# Patient Record
Sex: FEMALE | Race: BLACK OR AFRICAN AMERICAN | Employment: FULL TIME | URBAN - METROPOLITAN AREA
[De-identification: names, ages, dates, MRNs, and addresses within clinical notes are randomized per-mention and may not be internally consistent; named-entity substitution may affect disease eponyms.]

---

## 2018-10-24 DIAGNOSIS — Z12.31 VISIT FOR SCREENING MAMMOGRAM: Primary | ICD-10-CM

## 2018-11-01 ENCOUNTER — HOSPITAL ENCOUNTER (OUTPATIENT)
Dept: MAMMOGRAPHY | Age: 38
Discharge: HOME | End: 2018-11-01
Attending: SURGERY

## 2018-11-01 DIAGNOSIS — N63.20 LEFT BREAST LUMP: Primary | ICD-10-CM

## 2018-11-01 DIAGNOSIS — Z12.31 VISIT FOR SCREENING MAMMOGRAM: ICD-10-CM

## 2018-11-01 NOTE — PROGRESS NOTES
6400 Marianela Guzmán called asking for a dx 3d mammogram order for patient who has a LEFT breast lump. Order placed in [facility].

## 2018-11-05 ENCOUNTER — HOSPITAL ENCOUNTER (OUTPATIENT)
Dept: MAMMOGRAPHY | Age: 38
Discharge: HOME | End: 2018-11-05
Attending: SURGERY
Payer: COMMERCIAL

## 2018-11-05 ENCOUNTER — HOSPITAL ENCOUNTER (OUTPATIENT)
Dept: ULTRASOUND IMAGING | Age: 38
Discharge: HOME | End: 2018-11-05
Attending: SURGERY
Payer: COMMERCIAL

## 2018-11-05 DIAGNOSIS — N63.20 LEFT BREAST LUMP: Primary | ICD-10-CM

## 2018-11-05 DIAGNOSIS — Z12.31 VISIT FOR SCREENING MAMMOGRAM: ICD-10-CM

## 2018-11-05 DIAGNOSIS — N63.20 LEFT BREAST LUMP: ICD-10-CM

## 2018-11-05 PROCEDURE — 76642 ULTRASOUND BREAST LIMITED: CPT

## 2018-11-05 PROCEDURE — 77062 BREAST TOMOSYNTHESIS BI: CPT

## 2018-11-05 NOTE — PROGRESS NOTES
Spoke w/Ms. Leyva concerning her recommendation for surgical follow up - Ms. 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 I gave Dr. Marck Amaro card w/contact number - she is aware of Dr. Marck Amaro locations because her last visit was in 2016.

## 2018-11-08 ENCOUNTER — DOCUMENTATION ONLY (OUTPATIENT)
Dept: SURGERY | Age: 38
End: 2018-11-08

## 2018-11-08 ENCOUNTER — OFFICE VISIT (OUTPATIENT)
Dept: SURGERY | Age: 38
End: 2018-11-08

## 2018-11-08 VITALS
HEIGHT: 69 IN | WEIGHT: 226 LBS | SYSTOLIC BLOOD PRESSURE: 119 MMHG | HEART RATE: 96 BPM | DIASTOLIC BLOOD PRESSURE: 72 MMHG | BODY MASS INDEX: 33.47 KG/M2

## 2018-11-08 DIAGNOSIS — N61.1 LEFT BREAST ABSCESS: Primary | ICD-10-CM

## 2018-11-08 NOTE — PROGRESS NOTES
HISTORY OF PRESENT ILLNESS  Jaxon Tijerina is a 34 y.o. female. HPI ESTABLISHED patient of Dr. Peter Pereyra here for LEFT breast lump, pain, and nipple discharge. Rates pain 6/10 at this time. Describes pain as sharp, \"off and on\" pain. Has not started Clindamycin yet. Breast history-  10/26/16- RIGHT breast I&D for abscess. - LEFT breast I&D for abscess. OB History      Para Term  AB Living    3 2 1     2    SAB TAB Ectopic Molar Multiple Live Births              1        Obstetric Comments    Menarche:  13. LMP: has IUD. # of Children:  2. Age at Delivery of First Child:  23.   Hysterectomy/oophorectomy:  NO/NO. Breast Bx:  yes. Hx of Breast Feeding:  yes. BCP:  yes. Hormone therapy:  no.             Past Surgical History:   Procedure Laterality Date    BREAST SURGERY PROCEDURE UNLISTED  13    Incision & Drainage Left Breast Mastitis     DELIVERY ONLY      HX  SECTION       x 2    HX DILATION AND CURETTAGE       Family history-  Paternal aunt diagnosed with breast cancer at age 44. Breast imaging-  18 3D mammogram and LEFT breast ultrasound:  Study Result   INDICATION:  Left breast lump and pain and nipple discharge. EXAMS: Bilateral 3-D Diagnostic Mammogram and Left Breast Sonogram  COMPARISON: None. BILATERAL 3-D MARIYA DIAGNOSTIC DIGITAL MAMMOGRAPHY with CAD applied to the 2-D  images, is performed. Non-circumscribed noncalcified central left breast mass is  present. There is left breast dermal thickening. There are no suspicious  calcifications. Contralateral breast is unremarkable. Breast Composition: Scattered fibroglandular densities. BREAST SONOGRAPHY of the central left breast shows a nonsimple fluid collection  extending approximately 11 cm from 4-8:00 location, with diameters of  approximately 2 to 3 cm. Appearance suggests abscess versus hematoma. IMPRESSION  IMPRESSION:  1. BIRADS: 2, Benign.    2. Left breast abscess versus hematoma. Report called to the referring office  and patient scheduled for office visit tomorrow 2018 at 1130 hours at [facility]. 3. No evidence of malignancy. 4. Annual bilateral screening mammogram followup recommended. ROS    Physical Exam   Constitutional: She appears well-developed and well-nourished. Pulmonary/Chest:       Psychiatric: She has a normal mood and affect. Her speech is normal and behavior is normal.     Aspiration  Indication : abscess at LEFT 5:00  Prep : Alcohol. Anesthesia: Lidocaine 1% with epi  Guidance : Guidance by US  Yield :  10ml of serous fluid was aspirated with an 18 gauge needle. Effect : Abscess completely aspirated. Tolerance: Pt tolerated the procedure with minimal discomfort  Disposition:  Culture sent. Follow-up 5 days. Incision and Drainage  Indication : Left breast abscess,at [facility]  Prep : alcohol. Anesthesia : Lidocaine 1% with epi  Yield : Significant purulent fluid drained  Irrigation: 10ml saline    Packin/4\" nugauze  Diposition: Okay to shower. Change dressing as needed. Pt instructed how to replace packing daily. Follow up 5 days. Visit Vitals  /72   Pulse 96   Ht 5' 9\" (1.753 m)   Wt 226 lb (102.5 kg)   BMI 33.37 kg/m²     ASSESSMENT and PLAN  Encounter Diagnoses   Name Primary?  Left breast abscess Yes     Left breast extensive abscess with multiple pockets. One area aspirated and one area with I&D as described above. Procedures performed by Dr. Sandra Wiggins. Wound care reviewed. Starts antibiotics - clindamycin previously ordered. Has follow-up with Dr. Sandra Wiggins next week. She is comfortable with this plan. All questions answered and she stated understanding.

## 2018-11-08 NOTE — PATIENT INSTRUCTIONS

## 2018-11-08 NOTE — PROGRESS NOTES
Spoke to Brayan Miranda, who will make follow up appointment for patient to come in on Monday 11/12/18 at [facility] G11 to follow up LEFT breast abscess, s/p I&D.

## 2018-11-12 ENCOUNTER — OFFICE VISIT (OUTPATIENT)
Dept: SURGERY | Age: 38
End: 2018-11-12

## 2018-11-12 VITALS
HEART RATE: 103 BPM | WEIGHT: 226 LBS | SYSTOLIC BLOOD PRESSURE: 119 MMHG | DIASTOLIC BLOOD PRESSURE: 78 MMHG | HEIGHT: 69 IN | BODY MASS INDEX: 33.47 KG/M2

## 2018-11-12 DIAGNOSIS — N61.1 LEFT BREAST ABSCESS: Primary | ICD-10-CM

## 2018-11-12 LAB — BACTERIA SPEC ANAEROBE CULT: NORMAL

## 2018-11-12 RX ORDER — SULFAMETHOXAZOLE AND TRIMETHOPRIM 800; 160 MG/1; MG/1
1 TABLET ORAL 2 TIMES DAILY
Qty: 20 TAB | Refills: 0 | Status: SHIPPED | OUTPATIENT
Start: 2018-11-12 | End: 2018-11-22

## 2018-11-12 NOTE — PROGRESS NOTES
HISTORY OF PRESENT ILLNESS  Robin Horn is a 34 y.o. female. HPI ESTABLISHED patient here today for follow up I&D LEFT breast abscess. The patient does not feel well. The abscess continues to drain, pain is a #6/10  and the patient is running a fever. It is currently 100.4 after having taken Motrin before the visit. She continues to take Clindamycin. Her culture came back proteus mirabilis with sensitivities attached. Review of Systems   All other systems reviewed and are negative. Physical Exam   Pulmonary/Chest:       Left breast with inferior pole erythema,   Purulent drainage from periareolar opening. Nursing note and vitals reviewed. ASSESSMENT and PLAN    ICD-10-CM ICD-9-CM    1. Left breast abscess N61.1 611.0      - will switch to bactrim  - schedule for I and D in OR. If starts having fevers or chills may need iv abx.

## 2018-11-12 NOTE — H&P (VIEW-ONLY)
HISTORY OF PRESENT ILLNESS Armando Solis is a 34 y.o. female. HPI ESTABLISHED patient here today for follow up I&D LEFT breast abscess. The patient does not feel well. The abscess continues to drain, pain is a #6/10  and the patient is running a fever. It is currently 100.4 after having taken Motrin before the visit. She continues to take Clindamycin. Her culture came back proteus mirabilis with sensitivities attached. Review of Systems All other systems reviewed and are negative. Physical Exam  
Pulmonary/Chest:  
 
 
Left breast with inferior pole erythema,  
Purulent drainage from periareolar opening. Nursing note and vitals reviewed. ASSESSMENT and PLAN 
  ICD-10-CM ICD-9-CM 1. Left breast abscess N61.1 611.0   
 
- will switch to bactrim 
- schedule for I and D in OR. If starts having fevers or chills may need iv abx.

## 2018-11-12 NOTE — LETTER
NOTIFICATION OF RETURN TO WORK / SCHOOL 
11/12/2018 Ms. [address] [place] [phone] To Whom It May Concern: 
 
Jimena Ford is under the care of [facility] She will return to work on 11/14/2018  with no restrictions. If there are questions or concerns please have the patient contact our office. Sincerely, Hay Ramirez MD

## 2018-11-12 NOTE — PATIENT INSTRUCTIONS
Breast Abscess: Care Instructions  Your Care Instructions  An abscess is a pocket of pus formed by infection. Breast abscesses are most common in women who are breastfeeding. You can usually continue to breastfeed your baby in spite of a breast abscess. It will not harm your baby. If your doctor advises you to stop breastfeeding on the affected breast while it heals, you can continue breastfeeding from the healthy breast.  Sometimes antibiotics are used to treat a breast abscess. If antibiotics do not cure the abscess, it may need to be drained through a small cut (incision). You may have had a sedative to help you relax. You may be unsteady after having sedation. It can take a few hours for the medicine's effects to wear off. Common side effects of sedation include nausea, vomiting, and feeling sleepy or tired. The doctor has checked you carefully, but problems can develop later. If you notice any problems or new symptoms, get medical treatment right away. Follow-up care is a key part of your treatment and safety. Be sure to make and go to all appointments, and call your doctor if you are having problems. It's also a good idea to know your test results and keep a list of the medicines you take. How can you care for yourself at home? · If the doctor gave you a sedative:  ? For 24 hours, don't do anything that requires attention to detail. It takes time for the medicine's effects to completely wear off.  ? For your safety, do not drive or operate any machinery that could be dangerous. Wait until the medicine wears off and you can think clearly and react easily. · If your doctor prescribed antibiotics, take them as directed. Do not stop taking them just because you feel better. You need to take the full course of antibiotics. · If your doctor drained the abscess, you may have a tube or gauze in the abscess to allow it to continue draining.  Follow your doctor's instructions on bathing and caring for the wound.  · If you are breastfeeding, continue breastfeeding or pumping breast milk, as your doctor advises. It is important to empty your breasts regularly. However, your doctor may advise you to discard the milk from the affected breast until the abscess heals. ? Before breastfeeding, place a warm, wet washcloth over the breast for about 15 minutes. Try this at least 3 times a day. ? If pus is no longer draining from the abscess, breastfeed on both sides. ? Gently massage your breast to stimulate milk flow. ? Pump or hand-express a small amount of breast milk before breastfeeding if your breasts are too full with milk or if it hurts too much to nurse. This will make your breasts less full and may make it easier for your baby to nurse. ? Try feeding from the healthy breast. Then, after your milk is flowing, breastfeed from the affected breast until it feels soft. · Be safe with medicines. Take pain medicines exactly as directed. ? If your doctor gave you a prescription medicine for pain, take it as prescribed. ? If you are not taking a prescription pain medicine, ask your doctor if you can take an over-the-counter medicine. ? Do not take two or more pain medicines at the same time unless your doctor told you to. Many pain medicines have acetaminophen, which is Tylenol. Too much acetaminophen (Tylenol) can be harmful. · Put ice or a cold pack on your breast for 10 to 15 minutes at a time to reduce pain and swelling. If you are breastfeeding, do this between feedings. Put a thin cloth between the ice and your skin. · If pus is draining from your infected breast, wash the nipple gently and let it air-dry before you put your bra back on. A disposable breast pad placed in the bra cup will help absorb the pus. When should you call for help? Call 911 anytime you think you may need emergency care.  For example, call if:    · You have trouble breathing.     · You passed out (lost consciousness).    Call your doctor now or seek immediate medical care if:    · You have new or worse nausea or vomiting.     · Your symptoms of infection get worse. This may include:  ? Increased pain, swelling, redness, or warmth around a breast.  ? Red streaks extending from the breast.  ? Pus draining from a breast.  ? A new or higher fever.    Watch closely for changes in your health, and be sure to contact your doctor if:    · You do not get better as expected. Where can you learn more? Go to http://levon-dawson.info/. Enter F704 in the search box to learn more about \"Breast Abscess: Care Instructions. \"  Current as of: May 15, 2018  Content Version: 11.8  © 3970-2089 [employer]wise, [employer]. Care instructions adapted under license by [employer] (which disclaims liability or warranty for this information). If you have questions about a medical condition or this instruction, always ask your healthcare professional. [address] any warranty or liability for your use of this information.

## 2018-11-13 ENCOUNTER — ANESTHESIA EVENT (OUTPATIENT)
Dept: SURGERY | Age: 38
End: 2018-11-13
Payer: COMMERCIAL

## 2018-11-13 NOTE — PERIOP NOTES
[facility] Ambulatory Surgery Unit Pre-operative Instructions Surgery/Procedure Date  Wednesday, November 14, 2018            Tentative Arrival Time 9287 1. On the day of your surgery/procedure, please report to the Ambulatory Surgery Unit Registration Desk and sign in at your designated time. The Ambulatory Surgery Unit is located in [facility] on the [facility] side of the [facility] across from the [address] [place] [address]. Please have all of your health insurance cards and a photo ID. 2. You must have someone with you to drive you home, as you should not drive a car for 24 hours following anesthesia. Please make arrangements for a responsible adult friend or family member to stay with you for at least the first 24 hours after your surgery. 3. Do not have anything to eat or drink (including water, gum, mints, coffee, juice) after 11:59 PM, tonight. This may not apply to medications prescribed by your physician. (Please note below the special instructions with medications to take the morning of surgery, if applicable.) 4. We recommend you do not drink any alcoholic beverages for 24 hours before and after your surgery. 5. Contact your surgeons office for instructions on the following medications: non-steroidal anti-inflammatory drugs (i.e. Advil, Aleve), vitamins, and supplements. (Some surgeons will want you to stop these medications prior to surgery and others may allow you to take them) **If you are currently taking Plavix, Coumadin, Aspirin and/or other blood-thinning agents, contact your surgeon for instructions. ** Your surgeon will partner with the physician prescribing these medications to determine if it is safe to stop or if you need to continue taking. Please do not stop taking these medications without instructions from your surgeon.  
 
6. In an effort to help prevent surgical site infection, we ask that you shower with an anti-bacterial soap (i.e. Dial/Safeguard, or the soap provided to you at your preadmission testing appointment) for 3 days prior to and on the morning of surgery, using a fresh towel after each shower. (Please begin this process with fresh bed linens.) Do not apply any lotions, powders, or deodorants after the shower on the day of your procedure. If applicable, please do not shave the operative site for 48 hours prior to surgery. 7. Wear comfortable clothes. Wear glasses instead of contacts. Do not bring any jewelry or money (other than copays or fees as instructed). Do not wear make-up, particularly mascara, the morning of your surgery. Do not wear nail polish, particularly if you are having foot /hand surgery. Wear your hair loose or down, no ponytails, buns, ashley pins or clips. All body piercings must be removed. 8. You should understand that if you do not follow these instructions your surgery may be cancelled. If your physical condition changes (i.e. fever, cold or flu) please contact your surgeon as soon as possible. 9. It is important that you be on time. If a situation occurs where you may be late, or if you have any questions or problems, please call [phone]. 
 
10. Your surgery time may be subject to change. You will receive a phone call the day prior to surgery to confirm your arrival time. 11. Pediatric patients: please bring a change of clothes, diapers, bottle/sippy cup, pacifier, etc. 
 
 
Special Instructions: Take all medications and inhalers, as prescribed, on the morning of surgery with a sip of water EXCEPT: no over the counter medications day of surgery. I understand a pre-operative phone call will be made to verify my surgery time. In the event that I am not available, I give permission for a message to be left on my answering service and/or with another person? yes Preop instructions reviewed  Pt verbalized understanding. ___________________      ___________________      ________________ 
(Signature of Patient)          (Witness)                   (Date and Time)

## 2018-11-14 ENCOUNTER — HOSPITAL ENCOUNTER (OUTPATIENT)
Age: 38
Setting detail: OUTPATIENT SURGERY
Discharge: HOME | End: 2018-11-14
Attending: SURGERY | Admitting: SURGERY
Payer: COMMERCIAL

## 2018-11-14 ENCOUNTER — ANESTHESIA (OUTPATIENT)
Dept: SURGERY | Age: 38
End: 2018-11-14
Payer: COMMERCIAL

## 2018-11-14 VITALS
HEIGHT: 69 IN | DIASTOLIC BLOOD PRESSURE: 69 MMHG | BODY MASS INDEX: 33.47 KG/M2 | TEMPERATURE: 98.2 F | RESPIRATION RATE: 19 BRPM | OXYGEN SATURATION: 99 % | WEIGHT: 226 LBS | HEART RATE: 71 BPM | SYSTOLIC BLOOD PRESSURE: 107 MMHG

## 2018-11-14 DIAGNOSIS — N61.1 ABSCESS OF BREAST: Primary | ICD-10-CM

## 2018-11-14 LAB — HCG UR QL: NEGATIVE

## 2018-11-14 PROCEDURE — 87205 SMEAR GRAM STAIN: CPT

## 2018-11-14 PROCEDURE — 74011250636 HC RX REV CODE- 250/636

## 2018-11-14 PROCEDURE — 76210000034 HC AMBSU PH I REC 0.5 TO 1 HR: Performed by: SURGERY

## 2018-11-14 PROCEDURE — 87077 CULTURE AEROBIC IDENTIFY: CPT

## 2018-11-14 PROCEDURE — 76060000062 HC AMB SURG ANES 1 TO 1.5 HR: Performed by: SURGERY

## 2018-11-14 PROCEDURE — 77030020143 HC AIRWY LARYN INTUB CGAS -A: Performed by: REGISTERED NURSE

## 2018-11-14 PROCEDURE — 77030011267 HC ELECTRD BLD COVD -A: Performed by: SURGERY

## 2018-11-14 PROCEDURE — 77030011825 HC SUPP SURG PSTOP S2SG -B: Performed by: SURGERY

## 2018-11-14 PROCEDURE — 88305 TISSUE EXAM BY PATHOLOGIST: CPT

## 2018-11-14 PROCEDURE — 87075 CULTR BACTERIA EXCEPT BLOOD: CPT

## 2018-11-14 PROCEDURE — 76210000057 HC AMBSU PH II REC 1 TO 1.5 HR: Performed by: SURGERY

## 2018-11-14 PROCEDURE — 74011250637 HC RX REV CODE- 250/637: Performed by: ANESTHESIOLOGY

## 2018-11-14 PROCEDURE — 77030020255 HC SOL INJ LR 1000ML BG: Performed by: SURGERY

## 2018-11-14 PROCEDURE — 87186 SC STD MICRODIL/AGAR DIL: CPT

## 2018-11-14 PROCEDURE — 77030021352 HC CBL LD SYS DISP COVD -B: Performed by: SURGERY

## 2018-11-14 PROCEDURE — 74011000272 HC RX REV CODE- 272: Performed by: SURGERY

## 2018-11-14 PROCEDURE — 77030011640 HC PAD GRND REM COVD -A: Performed by: SURGERY

## 2018-11-14 PROCEDURE — 74011000250 HC RX REV CODE- 250: Performed by: SURGERY

## 2018-11-14 PROCEDURE — 74011250636 HC RX REV CODE- 250/636: Performed by: ANESTHESIOLOGY

## 2018-11-14 PROCEDURE — 74011250636 HC RX REV CODE- 250/636: Performed by: SURGERY

## 2018-11-14 PROCEDURE — 76030000001 HC AMB SURG OR TIME 1 TO 1.5: Performed by: SURGERY

## 2018-11-14 PROCEDURE — 81025 URINE PREGNANCY TEST: CPT

## 2018-11-14 RX ORDER — LIDOCAINE HYDROCHLORIDE 10 MG/ML
0.1 INJECTION, SOLUTION EPIDURAL; INFILTRATION; INTRACAUDAL; PERINEURAL AS NEEDED
Status: DISCONTINUED | OUTPATIENT
Start: 2018-11-14 | End: 2018-11-14 | Stop reason: HOSPADM

## 2018-11-14 RX ORDER — SODIUM CHLORIDE, SODIUM LACTATE, POTASSIUM CHLORIDE, CALCIUM CHLORIDE 600; 310; 30; 20 MG/100ML; MG/100ML; MG/100ML; MG/100ML
25 INJECTION, SOLUTION INTRAVENOUS CONTINUOUS
Status: DISCONTINUED | OUTPATIENT
Start: 2018-11-14 | End: 2018-11-14 | Stop reason: HOSPADM

## 2018-11-14 RX ORDER — SODIUM CHLORIDE 0.9 % (FLUSH) 0.9 %
5-10 SYRINGE (ML) INJECTION AS NEEDED
Status: DISCONTINUED | OUTPATIENT
Start: 2018-11-14 | End: 2018-11-14 | Stop reason: HOSPADM

## 2018-11-14 RX ORDER — VANCOMYCIN HYDROCHLORIDE 1 G/20ML
INJECTION, POWDER, LYOPHILIZED, FOR SOLUTION INTRAVENOUS
Status: DISCONTINUED
Start: 2018-11-14 | End: 2018-11-14 | Stop reason: HOSPADM

## 2018-11-14 RX ORDER — OXYCODONE HYDROCHLORIDE 5 MG/1
TABLET ORAL
Status: DISCONTINUED
Start: 2018-11-14 | End: 2018-11-14 | Stop reason: HOSPADM

## 2018-11-14 RX ORDER — FENTANYL CITRATE 50 UG/ML
INJECTION, SOLUTION INTRAMUSCULAR; INTRAVENOUS AS NEEDED
Status: DISCONTINUED | OUTPATIENT
Start: 2018-11-14 | End: 2018-11-14 | Stop reason: HOSPADM

## 2018-11-14 RX ORDER — HYDROMORPHONE HYDROCHLORIDE 1 MG/ML
.2-.5 INJECTION, SOLUTION INTRAMUSCULAR; INTRAVENOUS; SUBCUTANEOUS ONCE
Status: COMPLETED | OUTPATIENT
Start: 2018-11-14 | End: 2018-11-14

## 2018-11-14 RX ORDER — OXYCODONE AND ACETAMINOPHEN 5; 325 MG/1; MG/1
1 TABLET ORAL
Status: DISCONTINUED | OUTPATIENT
Start: 2018-11-14 | End: 2018-11-14 | Stop reason: HOSPADM

## 2018-11-14 RX ORDER — OXYCODONE AND ACETAMINOPHEN 5; 325 MG/1; MG/1
1 TABLET ORAL
Qty: 40 TAB | Refills: 0 | Status: SHIPPED | OUTPATIENT
Start: 2018-11-14 | End: 2018-12-06 | Stop reason: ALTCHOICE

## 2018-11-14 RX ORDER — DIPHENHYDRAMINE HYDROCHLORIDE 50 MG/ML
12.5 INJECTION, SOLUTION INTRAMUSCULAR; INTRAVENOUS AS NEEDED
Status: DISCONTINUED | OUTPATIENT
Start: 2018-11-14 | End: 2018-11-14 | Stop reason: HOSPADM

## 2018-11-14 RX ORDER — MORPHINE SULFATE 10 MG/ML
2 INJECTION, SOLUTION INTRAMUSCULAR; INTRAVENOUS
Status: DISCONTINUED | OUTPATIENT
Start: 2018-11-14 | End: 2018-11-14 | Stop reason: HOSPADM

## 2018-11-14 RX ORDER — SODIUM CHLORIDE 900 MG/100ML
250 INJECTION INTRAVENOUS ONCE
Status: DISCONTINUED | OUTPATIENT
Start: 2018-11-14 | End: 2018-11-14 | Stop reason: HOSPADM

## 2018-11-14 RX ORDER — SODIUM CHLORIDE 900 MG/100ML
INJECTION INTRAVENOUS
Status: COMPLETED
Start: 2018-11-14 | End: 2018-11-14

## 2018-11-14 RX ORDER — FENTANYL CITRATE 50 UG/ML
25 INJECTION, SOLUTION INTRAMUSCULAR; INTRAVENOUS
Status: DISCONTINUED | OUTPATIENT
Start: 2018-11-14 | End: 2018-11-14 | Stop reason: HOSPADM

## 2018-11-14 RX ORDER — ONDANSETRON 2 MG/ML
INJECTION INTRAMUSCULAR; INTRAVENOUS AS NEEDED
Status: DISCONTINUED | OUTPATIENT
Start: 2018-11-14 | End: 2018-11-14 | Stop reason: HOSPADM

## 2018-11-14 RX ORDER — LIDOCAINE HYDROCHLORIDE 20 MG/ML
INJECTION, SOLUTION EPIDURAL; INFILTRATION; INTRACAUDAL; PERINEURAL AS NEEDED
Status: DISCONTINUED | OUTPATIENT
Start: 2018-11-14 | End: 2018-11-14 | Stop reason: HOSPADM

## 2018-11-14 RX ORDER — ACETAMINOPHEN 10 MG/ML
INJECTION, SOLUTION INTRAVENOUS AS NEEDED
Status: DISCONTINUED | OUTPATIENT
Start: 2018-11-14 | End: 2018-11-14 | Stop reason: HOSPADM

## 2018-11-14 RX ORDER — DEXAMETHASONE SODIUM PHOSPHATE 4 MG/ML
INJECTION, SOLUTION INTRA-ARTICULAR; INTRALESIONAL; INTRAMUSCULAR; INTRAVENOUS; SOFT TISSUE AS NEEDED
Status: DISCONTINUED | OUTPATIENT
Start: 2018-11-14 | End: 2018-11-14 | Stop reason: HOSPADM

## 2018-11-14 RX ORDER — MIDAZOLAM HYDROCHLORIDE 1 MG/ML
INJECTION, SOLUTION INTRAMUSCULAR; INTRAVENOUS AS NEEDED
Status: DISCONTINUED | OUTPATIENT
Start: 2018-11-14 | End: 2018-11-14 | Stop reason: HOSPADM

## 2018-11-14 RX ORDER — SODIUM CHLORIDE 0.9 % (FLUSH) 0.9 %
5-10 SYRINGE (ML) INJECTION EVERY 8 HOURS
Status: DISCONTINUED | OUTPATIENT
Start: 2018-11-14 | End: 2018-11-14 | Stop reason: HOSPADM

## 2018-11-14 RX ORDER — OXYCODONE HYDROCHLORIDE 5 MG/1
5 TABLET ORAL ONCE
Status: COMPLETED | OUTPATIENT
Start: 2018-11-14 | End: 2018-11-14

## 2018-11-14 RX ORDER — PROPOFOL 10 MG/ML
INJECTION, EMULSION INTRAVENOUS AS NEEDED
Status: DISCONTINUED | OUTPATIENT
Start: 2018-11-14 | End: 2018-11-14 | Stop reason: HOSPADM

## 2018-11-14 RX ADMIN — ONDANSETRON 4 MG: 2 INJECTION INTRAMUSCULAR; INTRAVENOUS at 08:12

## 2018-11-14 RX ADMIN — MIDAZOLAM HYDROCHLORIDE 2 MG: 1 INJECTION, SOLUTION INTRAMUSCULAR; INTRAVENOUS at 07:28

## 2018-11-14 RX ADMIN — PROPOFOL 200 MG: 10 INJECTION, EMULSION INTRAVENOUS at 07:34

## 2018-11-14 RX ADMIN — FENTANYL CITRATE 50 MCG: 50 INJECTION, SOLUTION INTRAMUSCULAR; INTRAVENOUS at 08:17

## 2018-11-14 RX ADMIN — SODIUM CHLORIDE, SODIUM LACTATE, POTASSIUM CHLORIDE, AND CALCIUM CHLORIDE 25 ML/HR: 600; 310; 30; 20 INJECTION, SOLUTION INTRAVENOUS at 07:09

## 2018-11-14 RX ADMIN — SODIUM CHLORIDE 250 ML: 900 INJECTION, SOLUTION INTRAVENOUS at 07:26

## 2018-11-14 RX ADMIN — FENTANYL CITRATE 25 MCG: 50 INJECTION, SOLUTION INTRAMUSCULAR; INTRAVENOUS at 09:40

## 2018-11-14 RX ADMIN — DEXAMETHASONE SODIUM PHOSPHATE 8 MG: 4 INJECTION, SOLUTION INTRA-ARTICULAR; INTRALESIONAL; INTRAMUSCULAR; INTRAVENOUS; SOFT TISSUE at 07:47

## 2018-11-14 RX ADMIN — SODIUM CHLORIDE, SODIUM LACTATE, POTASSIUM CHLORIDE, AND CALCIUM CHLORIDE 25 ML/HR: 600; 310; 30; 20 INJECTION, SOLUTION INTRAVENOUS at 10:36

## 2018-11-14 RX ADMIN — FENTANYL CITRATE 25 MCG: 50 INJECTION, SOLUTION INTRAMUSCULAR; INTRAVENOUS at 10:18

## 2018-11-14 RX ADMIN — FENTANYL CITRATE 25 MCG: 50 INJECTION, SOLUTION INTRAMUSCULAR; INTRAVENOUS at 09:27

## 2018-11-14 RX ADMIN — VANCOMYCIN HYDROCHLORIDE 1000 MG: 1 INJECTION, POWDER, LYOPHILIZED, FOR SOLUTION INTRAVENOUS at 07:24

## 2018-11-14 RX ADMIN — FENTANYL CITRATE 50 MCG: 50 INJECTION, SOLUTION INTRAMUSCULAR; INTRAVENOUS at 08:05

## 2018-11-14 RX ADMIN — LIDOCAINE HYDROCHLORIDE 40 MG: 20 INJECTION, SOLUTION EPIDURAL; INFILTRATION; INTRACAUDAL; PERINEURAL at 07:34

## 2018-11-14 RX ADMIN — OXYCODONE HYDROCHLORIDE 5 MG: 5 TABLET ORAL at 10:02

## 2018-11-14 RX ADMIN — HYDROMORPHONE HYDROCHLORIDE 0.2 MG: 1 INJECTION, SOLUTION INTRAMUSCULAR; INTRAVENOUS; SUBCUTANEOUS at 09:49

## 2018-11-14 RX ADMIN — ACETAMINOPHEN 1000 MG: 10 INJECTION, SOLUTION INTRAVENOUS at 08:06

## 2018-11-14 RX ADMIN — FENTANYL CITRATE 25 MCG: 50 INJECTION, SOLUTION INTRAMUSCULAR; INTRAVENOUS at 08:26

## 2018-11-14 RX ADMIN — FENTANYL CITRATE 25 MCG: 50 INJECTION, SOLUTION INTRAMUSCULAR; INTRAVENOUS at 08:24

## 2018-11-14 RX ADMIN — FENTANYL CITRATE 50 MCG: 50 INJECTION, SOLUTION INTRAMUSCULAR; INTRAVENOUS at 07:34

## 2018-11-14 RX ADMIN — HYDROMORPHONE HYDROCHLORIDE 0.2 MG: 1 INJECTION, SOLUTION INTRAMUSCULAR; INTRAVENOUS; SUBCUTANEOUS at 09:21

## 2018-11-14 NOTE — OP NOTES
[address] REPORT    Neftali Kelly  MR#: [id number]  : 1980  ACCOUNT #: [de-identified]   DATE OF SERVICE: 2018    PREOPERATIVE DIAGNOSIS:  Left breast complicated abscess. POSTOPERATIVE DIAGNOSIS:  Left breast complicated abscess. PROCEDURE PERFORMED:  Incision and drainage, left breast tissue biopsy. SURGEON:  Micki Escalante MD       ASSISTANT:  None. ANESTHESIA:  General.      ESTIMATED BLOOD LOSS:  20 mL. SPECIMENS REMOVED:    1. Left breast abscess for culture. 2.  Left breast abscess tissue. FINDINGS:  Loculated complicated abscess, 694 mL of pus drained. COMPLICATIONS:  None. IMPLANTS:  No implants. INDICATIONS FOR PROCEDURE:  A 22-year-old female with a history of bilateral breast abscesses. She had a recurrent left breast abscess that I attempted to drain in the office under local anesthetic, and this did not get completely drained and therefore, she needed drainage in the operating room. PROCEDURE IN DETAIL:  The patient was seen in the preop holding area where surgical site was marked by surgeon. Informed consent was obtained. She was taken to the operating room and laid in supine position where LMA anesthesia was induced. Left breast prepped and draped in the usual fashion. A timeout was performed. Attention was turned to the left breast.  Using ultrasound, a large, loculated fluid collection was seen at 6 o'clock in the left breast and a previous periareolar incision at 9 o'clock had been made. A 10 blade was used to make a 1 cm incision. A tonsil was used to break up the loculations. These tunneled all the way over to 8 o'clock in the breast as well as 5 o'clock as well as had a connecting tunnel back behind the nipple. This was fully drained, and 150 mL of pus was drained. This was irrigated with 2 liters of normal saline with Bacitracin solution.   A tissue sample was taken at 6 o'clock and sent for permanent pathology. Cultures were obtained. Each area was packed with half inch gauze strip packing. She will follow up in 2 days to have this removed. A sterile dressing was applied as well as a bra. All sponge, needle, and instrument counts were correct. The patient was stable through the procedure.       MD TI Woodard /   D: 11/14/2018 08:37     T: 11/14/2018 09:04  JOB #: [id number]

## 2018-11-14 NOTE — PERIOP NOTES
[address] [facility] 1980 
[id number] Situation: 
Verbal report given from: Stuart Benton and 
 LEMUEL Hough RN Procedure: Procedure(s): 
INCISION AND DRAINAGE LEFT BREAST Background: 
 
Preoperative diagnosis: LEFT BREAST ABSCESS Postoperative diagnosis: * No post-op diagnosis entered * :  Dr. Josie Banerjee Assistant(s): Circ-1: Jeanie Barraza RN Scrub Tech-1: Sadie Major Specimens:  
ID Type Source Tests Collected by Time Destination 1 : Left breast abscess Preservative Breast  Mayte Valdez MD 11/14/2018 6468 Pathology 1 : Left breast abscess Abscess Breast ANAEROBIC/AEROBIC/GRAM STAIN Mayte Valdez MD 11/14/2018 8217 Microbiology Assessment: 
Intra-procedure medications Anesthesia gave intra-procedure sedation and medications, see anesthesia flow sheet Intravenous fluids: Marjan Henry Vital signs stable Recommendation: 
 
Permission to share finding with [facility].

## 2018-11-14 NOTE — ANESTHESIA POSTPROCEDURE EVALUATION
Procedure(s): 
INCISION AND DRAINAGE LEFT BREAST. Anesthesia Post Evaluation Multimodal analgesia: multimodal analgesia used between 6 hours prior to anesthesia start to PACU discharge Patient location during evaluation: bedside Patient participation: complete - patient participated Level of consciousness: awake and alert Pain score: 0 Airway patency: patent Anesthetic complications: no 
Cardiovascular status: hemodynamically stable Respiratory status: room air and spontaneous ventilation Hydration status: euvolemic Post anesthesia nausea and vomiting:  none Visit Vitals /64 Pulse 71 Temp 36.8 °C (98.2 °F) Resp 19 Ht 5' 9\" (1.753 m) Wt 102.5 kg (226 lb) SpO2 99% BMI 33.37 kg/m²

## 2018-11-14 NOTE — INTERVAL H&P NOTE
H&P Update: 
Benny Leyva was seen and examined. History and physical has been reviewed. The patient has been examined.  There have been no significant clinical changes since the completion of the originally dated History and Physical. 
 
Signed By: Danielle Ann MD   
 November 14, 2018 6:32 AM

## 2018-11-14 NOTE — ANESTHESIA PREPROCEDURE EVALUATION
Anesthetic History No history of anesthetic complications Review of Systems / Medical History Patient summary reviewed, nursing notes reviewed and pertinent labs reviewed Pulmonary Within defined limits Neuro/Psych Within defined limits Cardiovascular Within defined limits Exercise tolerance: >4 METS 
  
GI/Hepatic/Renal 
Within defined limits Endo/Other Within defined limits Other Findings Comments: PCOS Breast abcess Physical Exam 
 
Airway Mallampati: I 
TM Distance: 4 - 6 cm Neck ROM: normal range of motion Mouth opening: Normal 
 
 Cardiovascular Regular rate and rhythm,  S1 and S2 normal,  no murmur, click, rub, or gallop Rhythm: regular Rate: normal 
 
 
 
 Dental 
No notable dental hx Pulmonary Breath sounds clear to auscultation Abdominal 
GI exam deferred Other Findings Anesthetic Plan ASA: 2 Anesthesia type: general 
 
 
 
 
Induction: Intravenous Anesthetic plan and risks discussed with: Patient LMA OK

## 2018-11-14 NOTE — BRIEF OP NOTE
BRIEF OPERATIVE NOTE Date of Procedure: 11/14/2018 Preoperative Diagnosis: LEFT BREAST COMPLICATED ABSCESS Postoperative Diagnosis: SAME Procedure(s): 
INCISION AND DRAINAGE LEFT BREAST, TISSUE BIOPSY Surgeon(s) and Role: Destiny Weller MD - Primary Surgical Assistant: NONE Surgical Staff: 
Circ-1: Leora Moncada RN Scrub Tech-1: Brennan Gaspar Event Time In Time Out Incision Start 2885 Incision Close 1567 Anesthesia: General  
Estimated Blood Loss: 20 ML Specimens:  
ID Type Source Tests Collected by Time Destination 1 : Left breast abscess Preservative Breast  Les Mcfadden MD 11/14/2018 2266 Pathology 1 : Left breast abscess Abscess Breast ANAEROBIC/AEROBIC/GRAM STAIN Les Mcfadden MD 11/14/2018 9664 Microbiology Findings: LOCULATED COMPLICATED ABSCESS 
172 CC PUS DRAINED Complications: NONE Implants: * No implants in log * DICTATED [id number]

## 2018-11-14 NOTE — DISCHARGE INSTRUCTIONS
Discharge Instructions from Dr. Ebony Claude    · I will call you with the pathology results, typically within 1 week from today. · You may shower, but no hot tubs, swimming pools, or baths until your incision is healed. · No heavy lifting with the affected extremity (nothing greater than 5 pounds), and limit its use for the next 4-5 days. · You may use an ice pack for comfort for the next couple of days, but do not place ice directly on the skin. Rather, use a towel or clothing to serve as a barrier between skin and ice to prevent injury. · If I placed a drain, follow the drain instructions provided, especially as you keep a record of the drain output. · Follow medication instructions carefully. · Wear surgical bra for 24 hours, then remove. Wear supportive bra at all times. Dr. Ebony Claude will remove your packing this Friday. · You will have bruising and swelling  · Watch for signs of infection as listed below. · Redness  · Swelling  · Drainage from the incision or from your nipple that appears infected  · Fever over 101.5 degrees for consecutive readings, or over 99.5 if you are currently undergoing chemotherapy. · Call our office (number is below) for a follow-up appointment. · If you have any problems, our phone number is [phone]  You received an IV form of Tylenol 1000mg during your surgery, you may take tylenol (or pain medication containing Tylenol or Acetaminophen) in 6 hours at 2pm    DO NOT TAKE TYLENOL/ACETAMINOPHEN WITH PERCOCET. TAKE NARCOTIC PAIN MEDICATIONS WITH FOOD     If given 2 pain narcotics do NOT take together! Narcotics tend to be constipating, we suggest taking a stool softener such as Colace or Miralax (follow package instructions). DO NOT DRIVE WHILE TAKING NARCOTIC PAIN MEDICATIONS. DO NOT TAKE SLEEPING MEDICATIONS OR ANTIANXIETY MEDICATIONS WHILE TAKING NARCOTIC PAIN MEDICATIONS,  ESPECIALLY THE NIGHT OF ANESTHESIA!     CPAP PATIENTS BE SURE TO WEAR MACHINE WHENEVER NAPPING OR SLEEPING! DISCHARGE SUMMARY from Nurse    The following personal items collected during your admission are returned to you:   Dental Appliance: Dental Appliances: None  Vision: Visual Aid: Glasses, At home  Hearing Aid:    Jewelry: Jewelry: Other (comment)(waist beads removed)  Clothing: Clothing: Other (comment)(belonging bag)  Other Valuables: Other Valuables: Cell Phone(placed inside shoe per pt)  Valuables sent to safe:        PATIENT INSTRUCTIONS:    After General Anesthesia or Intravenous Sedation, for 24 hours or while taking prescription Narcotics:        Someone should be with you for the next 24 hours. For your own safety, a responsible adult must drive you home. · Limit your activities  · Recommended activity: Rest today, up with assistance today. Do not climb stairs or shower unattended for the next 24 hours. · Please start with a soft bland diet and advance as tolerated (no nausea) to regular diet. · If you have a sore throat you should try the following: fluids, warm salt water gargles, or throat lozenges. If it does not improve after several days please follow up with your primary physician. · Do not drive and operate hazardous machinery  · Do not make important personal or business decisions  · Do  not drink alcoholic beverages  · If you have not urinated within 8 hours after discharge, please contact your surgeon on call. Report the following to your surgeon:  · Excessive pain, swelling, redness or odor of or around the surgical area  · Temperature over 100.5  · Nausea and vomiting lasting longer than 4 hours or if unable to take medications  · Any signs of decreased circulation or nerve impairment to extremity: change in color, persistent  numbness, tingling, coldness or increase pain      · You will receive a Post Operative Call from one of the Recovery Room Nurses on the day after your surgery to check on you.  It is very important for us to know how you are recovering after your surgery. If you have an issue or need to speak with someone, please call your surgeon, do not wait for the post operative call. · You may receive an e-mail or letter in the mail from [facility] regarding your experience with us in the Ambulatory Surgery Unit. Your feedback is valuable to us and we appreciate your participation in the survey. · If the above instructions are not adequate or you are having problems after your surgery, call the physician at their office number. · We wish you a speedy recovery ? What to do at Home:      *  Please give a list of your current medications to your Primary Care Provider. *  Please update this list whenever your medications are discontinued, doses are      changed, or new medications (including over-the-counter products) are added. *  Please carry medication information at all times in case of emergency situations. These are general instructions for a healthy lifestyle:    No smoking/ No tobacco products/ Avoid exposure to second hand smoke    Surgeon General's Warning:  Quitting smoking now greatly reduces serious risk to your health. Obesity, smoking, and sedentary lifestyle greatly increases your risk for illness    A healthy diet, regular physical exercise & weight monitoring are important for maintaining a healthy lifestyle    You may be retaining fluid if you have a history of heart failure or if you experience any of the following symptoms:  Weight gain of 3 pounds or more overnight or 5 pounds in a week, increased swelling in our hands or feet or shortness of breath while lying flat in bed. Please call your doctor as soon as you notice any of these symptoms; do not wait until your next office visit.     Recognize signs and symptoms of STROKE:    B - Balance  E - Eyes    F-  Face looks uneven  A-  Arms unable to move or move even  S-  Speech slurred or non-existent  T-  Time-call 911 as soon as signs and symptoms begin-DO NOT go       Back to bed or wait to see if you get better-TIME IS BRAIN. If you have not received your influenza and/or pneumococcal vaccine, please follow up with your primary care physician. The discharge information has been reviewed with the patient and caregiver. The patient and caregiver verbalized understanding.

## 2018-11-15 ENCOUNTER — PATIENT OUTREACH (OUTPATIENT)
Dept: OTHER | Age: 38
End: 2018-11-15

## 2018-11-15 NOTE — PROGRESS NOTES
CM outreach. Verified  and address for HIPAA security. Introduced [employer] for patient. Patient does not identify any Care Management needs at this time and declines services. 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 MD hernández tomorrow for first dressing change. Chart Review/   OP I&D of L breast abscess  SPECIMENS REMOVED:   
1. Left breast abscess for culture. 2.  Left breast abscess tissue.   
  
FINDINGS:  Loculated complicated abscess, 788 mL of pus drained.

## 2018-11-16 ENCOUNTER — OFFICE VISIT (OUTPATIENT)
Dept: SURGERY | Age: 38
End: 2018-11-16

## 2018-11-16 VITALS
WEIGHT: 226 LBS | DIASTOLIC BLOOD PRESSURE: 74 MMHG | BODY MASS INDEX: 33.47 KG/M2 | SYSTOLIC BLOOD PRESSURE: 112 MMHG | HEIGHT: 69 IN | HEART RATE: 82 BPM

## 2018-11-16 DIAGNOSIS — N61.1 LEFT BREAST ABSCESS: Primary | ICD-10-CM

## 2018-11-16 LAB
BACTERIA SPEC CULT: ABNORMAL
BACTERIA SPEC CULT: NORMAL
GRAM STN SPEC: ABNORMAL
GRAM STN SPEC: ABNORMAL
SERVICE CMNT-IMP: ABNORMAL
SERVICE CMNT-IMP: NORMAL

## 2018-11-16 NOTE — PROGRESS NOTES
HISTORY OF PRESENT ILLNESS  Abigail Choe is a 34 y.o. female. HPI ESTABLISHED patient here for post op follow up LEFT breast I&D for a LEFT abscess. The patient is feeling better. Continues to take Bactrim and is afebrile. The patient has not changed her dressing since her surgery date 11/14/2018. She had to re-enforce the dressing once, POD #1. Culture grew proteus   FINAL PATHOLOGIC DIAGNOSIS   Breast tissue, left, excision:   Marked acute and chronic inflammation compatible with abscess cavity   No malignancy identified     Review of Systems   All other systems reviewed and are negative. Physical Exam   Pulmonary/Chest:       Left breast: markedly decreased erythema. Packing removed from both incisions  Purulent drainage noted. Nursing note and vitals reviewed. ASSESSMENT and PLAN    ICD-10-CM ICD-9-CM    1.  Left breast abscess N61.1 611.0      - daily packing  - continue bactrim  - f/u in 2-3 weeks

## 2018-11-16 NOTE — PATIENT INSTRUCTIONS
Breast Abscess: Care Instructions  Your Care Instructions  An abscess is a pocket of pus formed by infection. Breast abscesses are most common in women who are breastfeeding. You can usually continue to breastfeed your baby in spite of a breast abscess. It will not harm your baby. If your doctor advises you to stop breastfeeding on the affected breast while it heals, you can continue breastfeeding from the healthy breast.  Sometimes antibiotics are used to treat a breast abscess. If antibiotics do not cure the abscess, it may need to be drained through a small cut (incision). You may have had a sedative to help you relax. You may be unsteady after having sedation. It can take a few hours for the medicine's effects to wear off. Common side effects of sedation include nausea, vomiting, and feeling sleepy or tired. The doctor has checked you carefully, but problems can develop later. If you notice any problems or new symptoms, get medical treatment right away. Follow-up care is a key part of your treatment and safety. Be sure to make and go to all appointments, and call your doctor if you are having problems. It's also a good idea to know your test results and keep a list of the medicines you take. How can you care for yourself at home? · If the doctor gave you a sedative:  ? For 24 hours, don't do anything that requires attention to detail. It takes time for the medicine's effects to completely wear off.  ? For your safety, do not drive or operate any machinery that could be dangerous. Wait until the medicine wears off and you can think clearly and react easily. · If your doctor prescribed antibiotics, take them as directed. Do not stop taking them just because you feel better. You need to take the full course of antibiotics. · If your doctor drained the abscess, you may have a tube or gauze in the abscess to allow it to continue draining.  Follow your doctor's instructions on bathing and caring for the wound.  · If you are breastfeeding, continue breastfeeding or pumping breast milk, as your doctor advises. It is important to empty your breasts regularly. However, your doctor may advise you to discard the milk from the affected breast until the abscess heals. ? Before breastfeeding, place a warm, wet washcloth over the breast for about 15 minutes. Try this at least 3 times a day. ? If pus is no longer draining from the abscess, breastfeed on both sides. ? Gently massage your breast to stimulate milk flow. ? Pump or hand-express a small amount of breast milk before breastfeeding if your breasts are too full with milk or if it hurts too much to nurse. This will make your breasts less full and may make it easier for your baby to nurse. ? Try feeding from the healthy breast. Then, after your milk is flowing, breastfeed from the affected breast until it feels soft. · Be safe with medicines. Take pain medicines exactly as directed. ? If your doctor gave you a prescription medicine for pain, take it as prescribed. ? If you are not taking a prescription pain medicine, ask your doctor if you can take an over-the-counter medicine. ? Do not take two or more pain medicines at the same time unless your doctor told you to. Many pain medicines have acetaminophen, which is Tylenol. Too much acetaminophen (Tylenol) can be harmful. · Put ice or a cold pack on your breast for 10 to 15 minutes at a time to reduce pain and swelling. If you are breastfeeding, do this between feedings. Put a thin cloth between the ice and your skin. · If pus is draining from your infected breast, wash the nipple gently and let it air-dry before you put your bra back on. A disposable breast pad placed in the bra cup will help absorb the pus. When should you call for help? Call 911 anytime you think you may need emergency care.  For example, call if:    · You have trouble breathing.     · You passed out (lost consciousness).    Call your doctor now or seek immediate medical care if:    · You have new or worse nausea or vomiting.     · Your symptoms of infection get worse. This may include:  ? Increased pain, swelling, redness, or warmth around a breast.  ? Red streaks extending from the breast.  ? Pus draining from a breast.  ? A new or higher fever.    Watch closely for changes in your health, and be sure to contact your doctor if:    · You do not get better as expected. Where can you learn more? Go to http://levon-dawson.info/. Enter Q331 in the search box to learn more about \"Breast Abscess: Care Instructions. \"  Current as of: May 15, 2018  Content Version: 11.8  © 4758-1958 Healthwise, [employer]. Care instructions adapted under license by [employer] (which disclaims liability or warranty for this information). If you have questions about a medical condition or this instruction, always ask your healthcare professional. [address] any warranty or liability for your use of this information.

## 2018-11-21 ENCOUNTER — DOCUMENTATION ONLY (OUTPATIENT)
Dept: SURGERY | Age: 38
End: 2018-11-21

## 2018-11-21 NOTE — PROGRESS NOTES
FMLA ppw completed and faxed to patient's employer. Confirmation fax received. Copy will be scanned into chart.

## 2018-11-30 ENCOUNTER — OFFICE VISIT (OUTPATIENT)
Dept: SURGERY | Age: 38
End: 2018-11-30

## 2018-11-30 VITALS
DIASTOLIC BLOOD PRESSURE: 86 MMHG | WEIGHT: 226 LBS | SYSTOLIC BLOOD PRESSURE: 125 MMHG | HEART RATE: 75 BPM | HEIGHT: 69 IN | BODY MASS INDEX: 33.47 KG/M2

## 2018-11-30 DIAGNOSIS — L02.91 ABSCESS: Primary | ICD-10-CM

## 2018-11-30 RX ORDER — SULFAMETHOXAZOLE AND TRIMETHOPRIM 800; 160 MG/1; MG/1
1 TABLET ORAL 2 TIMES DAILY
Qty: 20 TAB | Refills: 0 | Status: SHIPPED | OUTPATIENT
Start: 2018-11-30 | End: 2018-12-10

## 2018-11-30 NOTE — PATIENT INSTRUCTIONS
Breast Abscess: Care Instructions  Your Care Instructions  An abscess is a pocket of pus formed by infection. Breast abscesses are most common in women who are breastfeeding. You can usually continue to breastfeed your baby in spite of a breast abscess. It will not harm your baby. If your doctor advises you to stop breastfeeding on the affected breast while it heals, you can continue breastfeeding from the healthy breast.  Sometimes antibiotics are used to treat a breast abscess. If antibiotics do not cure the abscess, it may need to be drained through a small cut (incision). You may have had a sedative to help you relax. You may be unsteady after having sedation. It can take a few hours for the medicine's effects to wear off. Common side effects of sedation include nausea, vomiting, and feeling sleepy or tired. The doctor has checked you carefully, but problems can develop later. If you notice any problems or new symptoms, get medical treatment right away. Follow-up care is a key part of your treatment and safety. Be sure to make and go to all appointments, and call your doctor if you are having problems. It's also a good idea to know your test results and keep a list of the medicines you take. How can you care for yourself at home? · If the doctor gave you a sedative:  ? For 24 hours, don't do anything that requires attention to detail. It takes time for the medicine's effects to completely wear off.  ? For your safety, do not drive or operate any machinery that could be dangerous. Wait until the medicine wears off and you can think clearly and react easily. · If your doctor prescribed antibiotics, take them as directed. Do not stop taking them just because you feel better. You need to take the full course of antibiotics. · If your doctor drained the abscess, you may have a tube or gauze in the abscess to allow it to continue draining.  Follow your doctor's instructions on bathing and caring for the wound.  · If you are breastfeeding, continue breastfeeding or pumping breast milk, as your doctor advises. It is important to empty your breasts regularly. However, your doctor may advise you to discard the milk from the affected breast until the abscess heals. ? Before breastfeeding, place a warm, wet washcloth over the breast for about 15 minutes. Try this at least 3 times a day. ? If pus is no longer draining from the abscess, breastfeed on both sides. ? Gently massage your breast to stimulate milk flow. ? Pump or hand-express a small amount of breast milk before breastfeeding if your breasts are too full with milk or if it hurts too much to nurse. This will make your breasts less full and may make it easier for your baby to nurse. ? Try feeding from the healthy breast. Then, after your milk is flowing, breastfeed from the affected breast until it feels soft. · Be safe with medicines. Take pain medicines exactly as directed. ? If your doctor gave you a prescription medicine for pain, take it as prescribed. ? If you are not taking a prescription pain medicine, ask your doctor if you can take an over-the-counter medicine. ? Do not take two or more pain medicines at the same time unless your doctor told you to. Many pain medicines have acetaminophen, which is Tylenol. Too much acetaminophen (Tylenol) can be harmful. · Put ice or a cold pack on your breast for 10 to 15 minutes at a time to reduce pain and swelling. If you are breastfeeding, do this between feedings. Put a thin cloth between the ice and your skin. · If pus is draining from your infected breast, wash the nipple gently and let it air-dry before you put your bra back on. A disposable breast pad placed in the bra cup will help absorb the pus. When should you call for help? Call 911 anytime you think you may need emergency care.  For example, call if:    · You have trouble breathing.     · You passed out (lost consciousness).    Call your doctor now or seek immediate medical care if:    · You have new or worse nausea or vomiting.     · Your symptoms of infection get worse. This may include:  ? Increased pain, swelling, redness, or warmth around a breast.  ? Red streaks extending from the breast.  ? Pus draining from a breast.  ? A new or higher fever.    Watch closely for changes in your health, and be sure to contact your doctor if:    · You do not get better as expected. Where can you learn more? Go to http://levon-dawson.info/. Enter J597 in the search box to learn more about \"Breast Abscess: Care Instructions. \"  Current as of: May 15, 2018  Content Version: 11.8  © 9255-9303 Healthwise, Mam[employer]. Care instructions adapted under license by [employer] (which disclaims liability or warranty for this information). If you have questions about a medical condition or this instruction, always ask your healthcare professional. [address] any warranty or liability for your use of this information.

## 2018-11-30 NOTE — LETTER
NOTIFICATION RETURN TO WORK / SCHOOL 
 
11/30/2018 9:06 AM 
 
Ms. [address] [place] [phone] To Whom It May Concern: 
 
Jimena Ford is currently under the care of [address] [place] [address]. She may return to work on Monday, December 10, 2018. If there are questions or concerns please have the patient contact our office. Sincerely, Baker Meigs, MD

## 2018-11-30 NOTE — PROGRESS NOTES
HISTORY OF PRESENT ILLNESS  Deana Maldonado is a 34 y.o. female. HPI ESTABLISHED patient here today for follow up LEFT breast abscess and S/P LEFT breast I & D 11/14/2018. The patient reports the smaller abscess as almost completely healed, but the larger one is still draining a yellowish drainage and aches with her daily activity. She reports an area under the larger abscess as \"being firm. \" The patient continues to pack the larger wound. Culture grew proteus   FINAL PATHOLOGIC DIAGNOSIS   Breast tissue, left, excision:   Marked acute and chronic inflammation compatible with abscess cavity   No malignancy identified   Review of Systems   All other systems reviewed and are negative. Physical Exam   Pulmonary/Chest:       Packing removed and repacked both sites  No erythema  Some residual induration  Incision sites clean. Nursing note and vitals reviewed. ASSESSMENT and PLAN    ICD-10-CM ICD-9-CM    1.  Abscess L02.91 682.9      - s/p I and D of complex breast abscess  - continue packing  - f/u 1 week  - restart abx

## 2018-12-03 ENCOUNTER — DOCUMENTATION ONLY (OUTPATIENT)
Dept: SURGERY | Age: 38
End: 2018-12-03

## 2018-12-03 NOTE — PROGRESS NOTES
Faxed over updated [facility] paperwork, extending patients return to work until after next follow-up appointment 12/06/2018

## 2018-12-06 ENCOUNTER — OFFICE VISIT (OUTPATIENT)
Dept: SURGERY | Age: 38
End: 2018-12-06

## 2018-12-06 ENCOUNTER — DOCUMENTATION ONLY (OUTPATIENT)
Dept: SURGERY | Age: 38
End: 2018-12-06

## 2018-12-06 VITALS
WEIGHT: 226 LBS | SYSTOLIC BLOOD PRESSURE: 107 MMHG | BODY MASS INDEX: 33.47 KG/M2 | DIASTOLIC BLOOD PRESSURE: 49 MMHG | HEART RATE: 71 BPM | HEIGHT: 69 IN

## 2018-12-06 DIAGNOSIS — N61.1 LEFT BREAST ABSCESS: Primary | ICD-10-CM

## 2018-12-06 RX ORDER — IBUPROFEN 200 MG
TABLET ORAL
COMMUNITY
End: 2022-01-10

## 2018-12-06 NOTE — PATIENT INSTRUCTIONS
Wound Care: After Your Visit  Your Care Instructions  Taking good care of your wound at home will help it heal quickly and reduce your chance of infection. The doctor has checked you carefully, but problems can develop later. If you notice any problems or new symptoms, get medical treatment right away. Follow-up care is a key part of your treatment and safety. Be sure to make and go to all appointments, and call your doctor if you are having problems. It's also a good idea to know your test results and keep a list of the medicines you take. How can you care for yourself at home? · Clean the area with soap and water 2 times a day unless your doctor gives you different instructions. Don't use hydrogen peroxide or alcohol, which can slow healing. ¨ You may cover the wound with a thin layer of antibiotic ointment, such as bacitracin, and a nonstick bandage. ¨ Apply more ointment and replace the bandage as needed. · Take pain medicines exactly as directed. Some pain is normal with a wound, but do not ignore pain that is getting worse instead of better. You could have an infection. ¨ If the doctor gave you a prescription medicine for pain, take it as prescribed. ¨ If you are not taking a prescription pain medicine, ask your doctor if you can take an over-the-counter medicine. · Your doctor may have closed your wound with stitches (sutures), staples, or skin glue. ¨ If you have stitches, your doctor may remove them after several days to 2 weeks. Or you may have stitches that dissolve on their own. ¨ If you have staples, your doctor may remove them after 7 to 10 days. ¨ If your wound was closed with skin glue, the glue will wear off in a few days to 2 weeks. When should you call for help? Call your doctor now or seek immediate medical care if:  · You have signs of infection, such as:  ¨ Increased pain, swelling, warmth, or redness near the wound. ¨ Red streaks leading from the wound.   ¨ Pus draining from the wound. ¨ A fever. · You bleed so much from your incision that you soak one or more bandages over 2 to 4 hours. Watch closely for changes in your health, and be sure to contact your doctor if:  · The wound is not getting better each day. Where can you learn more? Go to Rev.be  Enter M973 in the search box to learn more about \"Wound Care: After Your Visit. \"   © 9215-5576 Healthwise, Incorporated. Care instructions adapted under license by [employer] (which disclaims liability or warranty for this information). This care instruction is for use with your licensed healthcare professional. If you have questions about a medical condition or this instruction, always ask your healthcare professional. [place] [address] any warranty or liability for your use of this information. Content Version: 37.0.967573;  Last Revised: April 23, 2012

## 2018-12-06 NOTE — LETTER
NOTIFICATION RETURN TO WORK 
 
12/6/2018 8:50 AM 
 
Ms. Jimena Ford [address] [place] [phone] To Whom It May Concern: 
 
Jimena Ford is currently under the care of [address] [place] [address]. Please excuse Ms. Leyva from work, allowing her to return on Wednesday, December 12, 2018. If there are questions or concerns please have the patient contact our office. Sincerely, Ashley Cifuentes NP

## 2018-12-06 NOTE — PROGRESS NOTES
Filled out 1-page return to work form for the patient while she was in our office today. Made copy for scanning and gave her back the original copy.

## 2018-12-06 NOTE — PROGRESS NOTES
HISTORY OF PRESENT ILLNESS  Huma Garcia is a 34 y.o. female. HPI ESTABLISHED patient here for follow up s/p LEFT breast I&D 11/14/18. Patient states that the smaller (\"higher\") site is almost completely healed, however the larger (\"lower\") site is still needing to be packed. Denies pain at this time. Using ibuprofen as needed. Continues Bactrim. Breast history-  2014- LEFT breast I&D for abscess. 10/26/16- RIGHT breast I&D for abscess. 11/14/18- LEFT breast I&D for abscess.      Family history-  Paternal aunt diagnosed with breast cancer at age 44.      Breast imaging-  11/5/18 3D mammogram and LEFT breast ultrasound. ROS    Physical Exam   Pulmonary/Chest:       Mild induration at both sites. No erythema. Sites clean. Visit Vitals  /49   Pulse 71   Ht 5' 9\" (1.753 m)   Wt 226 lb (102.5 kg)   BMI 33.37 kg/m²     ASSESSMENT and PLAN  Encounter Diagnoses   Name Primary?  Left breast abscess Yes     Continue packing changes at home. Complete Bactrim. FMLA extended to Wed, Dec. 12.    RTC in 1 week.

## 2018-12-06 NOTE — Clinical Note
Superior abscess healed except skin. Inferior abscess - granulation tissue visible at base of cavity and small amount of packing placed. She will see you next week.

## 2018-12-07 ENCOUNTER — DOCUMENTATION ONLY (OUTPATIENT)
Dept: SURGERY | Age: 38
End: 2018-12-07

## 2018-12-07 ENCOUNTER — TELEPHONE (OUTPATIENT)
Dept: SURGERY | Age: 38
End: 2018-12-07

## 2018-12-07 NOTE — TELEPHONE ENCOUNTER
Received a phone call from patient inquiring about [facility] paperwork and if it had been faxed. She stated that she was told that it wasn't received. I told her that the [facility] paperwork had been faxed on 11/16/18 and 12/3/18(updated). She asked me to fax a copy of the paperwork to [facility] @ [facility] - [phone](TCQ) because she has been out of work for a month and has not received a pay check. I faxed it to [facility].

## 2018-12-14 ENCOUNTER — OFFICE VISIT (OUTPATIENT)
Dept: SURGERY | Age: 38
End: 2018-12-14

## 2018-12-14 VITALS
DIASTOLIC BLOOD PRESSURE: 71 MMHG | BODY MASS INDEX: 33.47 KG/M2 | SYSTOLIC BLOOD PRESSURE: 112 MMHG | HEART RATE: 78 BPM | WEIGHT: 226 LBS | HEIGHT: 69 IN

## 2018-12-14 DIAGNOSIS — N61.1 LEFT BREAST ABSCESS: Primary | ICD-10-CM

## 2018-12-14 NOTE — PATIENT INSTRUCTIONS

## 2018-12-14 NOTE — PROGRESS NOTES
HISTORY OF PRESENT ILLNESS  Ivanna Mccormick is a 34 y.o. female. HPI ESTABLISHED patient here for follow up s/p LEFT breast I&D 11/14/18. No longer packing either site. Not having any pain at this time and taking ibuprofen as needed. Continues Bactrim. Went back to work on Wednesday. Breast history-  2014- LEFT breast I&D for abscess. 10/26/16- RIGHT breast I&D for abscess. 11/14/18- LEFT breast I&D for abscess.      Family history-  Paternal aunt diagnosed with breast cancer at age 39.      Breast imaging-  11/5/18 3D mammogram and LEFT breast ultrasound. Review of Systems   All other systems reviewed and are negative. Physical Exam   Pulmonary/Chest:   Left breast much softer. No erythema or induration. Minimal drainage from inferior incision   Nursing note and vitals reviewed. ASSESSMENT and PLAN    ICD-10-CM ICD-9-CM    1.  Left breast abscess N61.1 611.0      - healing well  - finish abx  - f/u prn

## 2020-08-27 ENCOUNTER — EMPLOYEE WELLNESS (OUTPATIENT)
Dept: OTHER | Facility: CLINIC | Age: 40
End: 2020-08-27

## 2020-08-27 LAB
CHOLEST SERPL-MCNC: 149 MG/DL
GLUCOSE SERPL-MCNC: 88 MG/DL (ref 65–100)
HDLC SERPL-MCNC: 54 MG/DL
LDLC SERPL CALC-MCNC: 84.8 MG/DL (ref 0–100)
TRIGL SERPL-MCNC: 51 MG/DL (ref ?–150)

## 2021-08-25 LAB
CHOLEST SERPL-MCNC: 140 MG/DL
GLUCOSE SERPL-MCNC: 84 MG/DL (ref 65–100)
HDLC SERPL-MCNC: 48 MG/DL
LDLC SERPL CALC-MCNC: 81.2 MG/DL (ref 0–100)
TRIGL SERPL-MCNC: 54 MG/DL (ref ?–150)

## 2022-01-04 ENCOUNTER — OFFICE VISIT (OUTPATIENT)
Dept: URGENT CARE | Age: 42
End: 2022-01-04
Payer: COMMERCIAL

## 2022-01-04 VITALS — RESPIRATION RATE: 17 BRPM | OXYGEN SATURATION: 99 % | TEMPERATURE: 98.1 F | HEART RATE: 60 BPM

## 2022-01-04 DIAGNOSIS — Z20.822 SUSPECTED COVID-19 VIRUS INFECTION: Primary | ICD-10-CM

## 2022-01-04 PROCEDURE — 99202 OFFICE O/P NEW SF 15 MIN: CPT | Performed by: FAMILY MEDICINE

## 2022-01-04 NOTE — PROGRESS NOTES
This patient was seen at [address] [place] [address] Urgent Care while in their vehicle due to COVID-19 pandemic with PPE and focused examination in order to decrease community viral transmission. The patient/guardian gave verbal consent to treat. The history is provided by the patient. Asymptomatic  She had transient exposure last week- had home test negative on Thursday  No symptoms  She is been vaccinated     Past Medical History:   Diagnosis Date    Breast abscess     Breast lump     bilateral    Nipple discharge     Other ill-defined conditions(799.89)     high prolactin levels    PCOS (polycystic ovarian syndrome)         Past Surgical History:   Procedure Laterality Date    HX  SECTION       x 2    HX CYST INCISION AND DRAINAGE  2013    INCISION AND DRAINAGE BREAST performed by Joy Hayden MD at [facility]    HX CYST INCISION AND DRAINAGE Right 10/26/2016    INCISION AND DRAINAGE RIGHT BREAST performed by Sharmaine Talbot MD at [facility]    HX CYST INCISION AND DRAINAGE Left 2018    INCISION AND DRAINAGE LEFT BREAST [facility]    HX DILATION AND CURETTAGE      TX BREAST SURGERY PROCEDURE UNLISTED  13    Incision & Drainage Left Breast Mastitis    [place]  DELIVERY ONLY           Family History   Problem Relation Age of Onset    Cancer Other 42        breast cancer        Social History     Socioeconomic History    Marital status: SINGLE     Spouse name: Not on file    Number of children: Not on file    Years of education: Not on file    Highest education level: Not on file   Occupational History    Not on file   Tobacco Use    Smoking status: Never Smoker    Smokeless tobacco: Never Used   Substance and Sexual Activity    Alcohol use:  Yes     Alcohol/week: 1.0 - 2.0 standard drink     Types: 1 - 2 Standard drinks or equivalent per week    Drug use: No    Sexual activity: Yes Partners: Male     Birth control/protection: Condom   Other Topics Concern    Not on file   Social History Narrative    Not on file     Social Determinants of Health     Financial Resource Strain:     Difficulty of Paying Living Expenses: Not on file   Food Insecurity:     Worried About Running Out of Food in the Last Year: Not on file    Carlos of Food in the Last Year: Not on file   Transportation Needs:     Lack of Transportation (Medical): Not on file    Lack of Transportation (Non-Medical): Not on file   Physical Activity:     Days of Exercise per Week: Not on file    Minutes of Exercise per Session: Not on file   Stress:     Feeling of Stress : Not on file   Social Connections:     Frequency of Communication with Friends and Family: Not on file    Frequency of Social Gatherings with Friends and Family: Not on file    Attends Quaker Services: Not on file    Active Member of [address] [place] [address] [employer] or Organizations: Not on file    Attends Club or Organization Meetings: Not on file    Marital Status: Not on file   Intimate Partner Violence:     Fear of Current or Ex-Partner: Not on file    Emotionally Abused: Not on file    Physically Abused: Not on file    Sexually Abused: Not on file   Housing Stability:     Unable to Pay for Housing in the Last Year: Not on file    Number of [place] in the Last Year: Not on file    Unstable Housing in the Last Year: Not on file                ALLERGIES: Patient has no known allergies. Review of Systems   All other systems reviewed and are negative. Vitals:    01/04/22 1040   Pulse: 60   Resp: 17   Temp: 98.1 °F (36.7 °C)   SpO2: 99%       Physical Exam  Vitals and nursing note reviewed. Constitutional:       General: She is not in acute distress. Appearance: She is not ill-appearing. Pulmonary:      Effort: Pulmonary effort is normal. No respiratory distress. Breath sounds: No wheezing.          MDM    Procedures             ICD-10-CM ICD-9-CM    1. Suspected COVID-19 virus infection  Z20.822 V01.79 NOVEL CORONAVIRUS (COVID-19)       COVID- 19 TEST DONE  QUARANTINE UNTIL RESULT COMES BACK  SYMPTOMATIC RX  IF DEVELOP RESPIRATORY DISTRESS GO TO ED  No orders of the defined types were placed in this encounter. No results found for any visits on 01/04/22. The patients condition was discussed with the patient and they understand. The patient is to follow up with primary care doctor. If signs and symptoms become worse the pt is to go to the ER. The patient is to take medications as prescribed.

## 2022-01-06 LAB
SARS-COV-2, NAA 2 DAY TAT: NORMAL
SARS-COV-2, NAA: NOT DETECTED

## 2022-01-10 ENCOUNTER — OFFICE VISIT (OUTPATIENT)
Dept: URGENT CARE | Age: 42
End: 2022-01-10
Payer: COMMERCIAL

## 2022-01-10 VITALS — RESPIRATION RATE: 14 BRPM | OXYGEN SATURATION: 99 % | TEMPERATURE: 98.7 F | HEART RATE: 60 BPM

## 2022-01-10 DIAGNOSIS — Z20.822 EXPOSURE TO COVID-19 VIRUS: Primary | ICD-10-CM

## 2022-01-10 DIAGNOSIS — R05.9 COUGH: ICD-10-CM

## 2022-01-10 PROCEDURE — 99212 OFFICE O/P EST SF 10 MIN: CPT | Performed by: FAMILY MEDICINE

## 2022-01-10 NOTE — PROGRESS NOTES
This patient was seen at [address] [place] [address] Urgent Care while in their vehicle due to COVID-19 pandemic with PPE and focused examination in order to decrease community viral transmission. The patient/guardian gave verbal consent to treat. Tariq Alanis is a 35 y.o. female who presents with cough, runny nose, scratchy throat x 1 week. Was exposed to COVID-19 by children. Denies fever, SOB, N/V/D. The history is provided by the patient. Past Medical History:   Diagnosis Date    Breast abscess     Breast lump     bilateral    Nipple discharge     Other ill-defined conditions(799.89)     high prolactin levels    PCOS (polycystic ovarian syndrome)         Past Surgical History:   Procedure Laterality Date    HX  SECTION       x 2    HX CYST INCISION AND DRAINAGE  2013    INCISION AND DRAINAGE BREAST performed by Óscar Phillips MD at [facility]    HX CYST INCISION AND DRAINAGE Right 10/26/2016    INCISION AND DRAINAGE RIGHT BREAST performed by Lucian Guerrero MD at [facility]    HX CYST INCISION AND DRAINAGE Left 2018    INCISION AND DRAINAGE LEFT BREAST performed by Aminata Dean MD at [facility]    HX DILATION AND CURETTAGE      MA BREAST SURGERY PROCEDURE UNLISTED  13    Incision & Drainage Left Breast Mastitis    [place]  DELIVERY ONLY           Family History   Problem Relation Age of Onset    Cancer Other 42        breast cancer        Social History     Socioeconomic History    Marital status: SINGLE     Spouse name: Not on file    Number of children: Not on file    Years of education: Not on file    Highest education level: Not on file   Occupational History    Not on file   Tobacco Use    Smoking status: Never Smoker    Smokeless tobacco: Never Used   Substance and Sexual Activity    Alcohol use:  Yes     Alcohol/week: 1.0 - 2.0 standard drink     Types: 1 - 2 Standard drinks or equivalent per week    Drug use: No    Sexual activity: Yes     Partners: Male     Birth control/protection: Condom   Other Topics Concern    Not on file   Social History Narrative    Not on file     Social Determinants of Health     Financial Resource Strain:     Difficulty of Paying Living Expenses: Not on file   Food Insecurity:     Worried About Running Out of Food in the Last Year: Not on file    Carlos of Food in the Last Year: Not on file   Transportation Needs:     Lack of Transportation (Medical): Not on file    Lack of Transportation (Non-Medical): Not on file   Physical Activity:     Days of Exercise per Week: Not on file    Minutes of Exercise per Session: Not on file   Stress:     Feeling of Stress : Not on file   Social Connections:     Frequency of Communication with Friends and Family: Not on file    Frequency of Social Gatherings with Friends and Family: Not on file    Attends Anabaptism Services: Not on file    Active Member of [address] [place] [address] [employer] or Organizations: Not on file    Attends Club or Organization Meetings: Not on file    Marital Status: Not on file   Intimate Partner Violence:     Fear of Current or Ex-Partner: Not on file    Emotionally Abused: Not on file    Physically Abused: Not on file    Sexually Abused: Not on file   Housing Stability:     Unable to Pay for Housing in the Last Year: Not on file    Number of [place] in the Last Year: Not on file    Unstable Housing in the Last Year: Not on file                ALLERGIES: Patient has no known allergies. Review of Systems   Constitutional: Negative for activity change, appetite change and fever. HENT: Positive for rhinorrhea and sore throat. Respiratory: Positive for cough. Negative for shortness of breath. Gastrointestinal: Negative for diarrhea, nausea and vomiting. Vitals:    01/10/22 0936   Pulse: 60   Resp: 14   Temp: 98.7 °F (37.1 °C)   SpO2: 99%       Physical Exam  Vitals and nursing note reviewed.    Constitutional: General: She is not in acute distress. Appearance: She is well-developed. She is not diaphoretic. Pulmonary:      Effort: Pulmonary effort is normal. No respiratory distress. Breath sounds: Normal breath sounds. No stridor. No wheezing, rhonchi or rales. Neurological:      Mental Status: She is alert. Psychiatric:         Behavior: Behavior normal.         Thought Content: Thought content normal.         Judgment: Judgment normal.         MDM    ICD-10-CM ICD-9-CM   1. Exposure to COVID-19 virus  Z20.822 V01.79   2. Cough  R05.9 786.2       Orders Placed This Encounter    NOVEL CORONAVIRUS (COVID-19)     Scheduling Instructions:      1) Due to current limited availability of the COVID-19 PCR test, tests will be prioritized and may not be completed.              2) Order only if the test result will change clinical management or necessary for a return to mission-critical employment decision.              3) Print and instruct patient to adhere to CDC home isolation program. (Link Above)              4) Set up or refer patient for a monitoring program.              5) Have patient sign up for and leverage [employer] (if not previously done). Order Specific Question:   Is this test for diagnosis or screening? Answer:   Diagnosis of ill patient     Order Specific Question:   Symptomatic for COVID-19 as defined by CDC? Answer:   Yes     Order Specific Question:   Date of Symptom Onset     Answer:   1/3/2022     Order Specific Question:   Hospitalized for COVID-19? Answer:   No     Order Specific Question:   Admitted to ICU for COVID-19? Answer:   No     Order Specific Question:   Employed in healthcare setting? Answer:   Unknown     Order Specific Question:   Resident in a congregate (group) care setting? Answer:   No     Order Specific Question:   Pregnant? Answer:   No     Order Specific Question:   Previously tested for COVID-19?      Answer:   Yes        Quarantine, await COVID results  Deep breathing exercises, ambulation  Tylenol prn  Increase fluids with electrolytes if decreased PO intake    If signs and symptoms become worse the pt is to go to the ER.          Procedures

## 2022-01-10 NOTE — LETTER
NOTIFICATION TO RETURN TO WORK / SCHOOL    01/10/22     Ms. Rajesh [address] [address]       To Whom It May Concern:    Aylin Leyva was seen today at Health system. She is to quarantine and await test results. If there are questions or concerns please have the patient contact our office.         Sincerely,      Roderick Medina MD

## 2022-01-12 LAB
SARS-COV-2, NAA 2 DAY TAT: NORMAL
SARS-COV-2, NAA: NOT DETECTED

## 2022-08-08 LAB
CHOLEST SERPL-MCNC: 135 MG/DL
GLUCOSE SERPL-MCNC: 84 MG/DL (ref 65–100)
HDLC SERPL-MCNC: 47 MG/DL
LDLC SERPL CALC-MCNC: 67.2 MG/DL (ref 0–100)
TRIGL SERPL-MCNC: 104 MG/DL (ref ?–150)

## 2022-12-09 ENCOUNTER — CLINICAL SUPPORT (OUTPATIENT)
Dept: PRIMARY CARE CLINIC | Age: 42
End: 2022-12-09

## 2022-12-09 DIAGNOSIS — Z23 NEEDS FLU SHOT: Primary | ICD-10-CM

## 2023-07-27 ENCOUNTER — TELEPHONE (OUTPATIENT)
Age: 43
End: 2023-07-27

## 2023-07-27 NOTE — TELEPHONE ENCOUNTER
Patient called to schedule appointment and would like to switch providers , patient has seen Marzena Chambers in the past    Needs approval

## 2023-08-21 ENCOUNTER — OFFICE VISIT (OUTPATIENT)
Age: 43
End: 2023-08-21
Payer: COMMERCIAL

## 2023-08-21 VITALS
BODY MASS INDEX: 27.52 KG/M2 | RESPIRATION RATE: 16 BRPM | WEIGHT: 181.6 LBS | DIASTOLIC BLOOD PRESSURE: 75 MMHG | TEMPERATURE: 97.3 F | SYSTOLIC BLOOD PRESSURE: 114 MMHG | HEIGHT: 68 IN | OXYGEN SATURATION: 98 % | HEART RATE: 71 BPM

## 2023-08-21 DIAGNOSIS — R22.2 MASS OF SUBCUTANEOUS TISSUE OF BACK: Primary | ICD-10-CM

## 2023-08-21 PROCEDURE — 99203 OFFICE O/P NEW LOW 30 MIN: CPT | Performed by: SURGERY

## 2023-08-21 RX ORDER — TOPIRAMATE 25 MG/1
TABLET ORAL
COMMUNITY
Start: 2023-08-02

## 2023-08-21 RX ORDER — PHENTERMINE HYDROCHLORIDE 37.5 MG/1
TABLET ORAL
COMMUNITY
Start: 2023-08-02

## (undated) DEVICE — ABDOMINAL PAD: Brand: DERMACEA

## (undated) DEVICE — CONTINU-FLO SOLUTION SET, 2 INJECTION SITES, MALE LUER LOCK ADAPTER WITH RETRACTABLE COLLAR, LARGE BORE STOPCOCK WITH ROTATING MALE LUER LOCK EXTENSION SET, 2 INJECTION SITES, MALE LUER LOCK ADAPTER WITH RETRACTABLE COLLAR: Brand: INTERLINK/CONTINU-FLO

## (undated) DEVICE — SWAB CULT LIQ STUART AGR AERB MOD IN BRK SGL RAYON TIP PLAS 220099] BECTON DICKINSON MICRO]

## (undated) DEVICE — GOWN,SIRUS,FABRNF,XL,20/CS: Brand: MEDLINE

## (undated) DEVICE — GAUZE SPONGES,12 PLY: Brand: CURITY

## (undated) DEVICE — SPONGE LAP 18X18IN STRL -- 5/PK

## (undated) DEVICE — CHEST/BREAST-LF: Brand: MEDLINE INDUSTRIES, INC.

## (undated) DEVICE — SWAB CULT DBL W/O CHAR RAYON TIP AMIES GEL CLMN FOR COLL

## (undated) DEVICE — 3M™ TEGADERM™ TRANSPARENT FILM DRESSING FRAME STYLE, 1624W, 2-3/8 IN X 2-3/4 IN (6 CM X 7 CM), 100/CT 4CT/CASE: Brand: 3M™ TEGADERM™

## (undated) DEVICE — TOWEL SURG W17XL27IN STD BLU COT NONFENESTRATED PREWASHED

## (undated) DEVICE — KENDALL DL ECG CABLE AND LEAD WIRE SYSTEM, 3-LEAD, SINGLE PATIENT USE: Brand: KENDALL

## (undated) DEVICE — INSULATED BLADE ELECTRODE: Brand: EDGE

## (undated) DEVICE — APPLICATOR FBR TIP L6IN COT TIP WOOD SHFT SWAB 2000 PER CA

## (undated) DEVICE — NEEDLE HYPO 25GA L1.5IN BVL ORIENTED ECLIPSE

## (undated) DEVICE — SYR 10ML LUER LOK 1/5ML GRAD --

## (undated) DEVICE — 3M™ STERI-STRIP™ REINFORCED ADHESIVE SKIN CLOSURES, R1542, 1/4 IN X 1-1/2 IN (6 MM X 38 MM), 6 STRIPS/ENVELOPE: Brand: 3M™ STERI-STRIP™

## (undated) DEVICE — STERILE POLYISOPRENE POWDER-FREE SURGICAL GLOVES WITH EMOLLIENT COATING: Brand: PROTEXIS

## (undated) DEVICE — SUPPORT MAMM SURG 48-50 IN 2XL BRA

## (undated) DEVICE — SOLUTION LACTATED RINGERS INJECTION USP

## (undated) DEVICE — 1010 S-DRAPE TOWEL DRAPE 10/BX: Brand: STERI-DRAPE™

## (undated) DEVICE — INFECTION CONTROL KIT SYS

## (undated) DEVICE — ROCKER SWITCH PENCIL BLADE ELECTRODE, HOLSTER: Brand: EDGE

## (undated) DEVICE — TIP SUCT BLU PLAS BLB W/O CTRL VENT YANK

## (undated) DEVICE — LIGHT HANDLE: Brand: DEVON

## (undated) DEVICE — REM POLYHESIVE ADULT PATIENT RETURN ELECTRODE: Brand: VALLEYLAB

## (undated) DEVICE — (D)PREP SKN CHLRAPRP APPL 26ML -- CONVERT TO ITEM 371833